# Patient Record
(demographics unavailable — no encounter records)

---

## 2025-02-07 NOTE — REASON FOR VISIT
[FreeTextEntry2] : 02/07/2025: 33-year-old female here today for new Injury. They have LT hand pain. She states in the middle of November; she has been experiencing numbness and tingling. Assumes it's a pinch nerve. On and off discomfort. Had same issue in 2017 and went to PT for it.

## 2025-02-07 NOTE — ASSESSMENT
[FreeTextEntry1] :   Carpal tunnel syndrome is a progressive problem that once occurs will be a chronic issue that will likely continue until surgical treatment is necessary. Carpal tunnel may or may not be symptomatic. Treatment options for carpal tunnel include OTC NSAIDS, prescription NSAIDS, ice, bracing, OT, cortisone injection, and surgical release.  I rec EMG  Medrol Dose Tanvir prescribed for pain, swelling and inflammation. Patient instructed to take as directed on packaging to be completed in 6 days.  return after EMG

## 2025-02-07 NOTE — HISTORY OF PRESENT ILLNESS
[5] : 5 [Dull/Aching] : dull/aching [de-identified] : L hand numbness since Nov She had similar issue in 2017 which went away with therapy

## 2025-02-07 NOTE — PHYSICAL EXAM
[de-identified] : L hand:  Tender volar wrist  Good finger ROM  +Tinels  +Phalens  +Compression test  Decreased sensation median nerve distribution

## 2025-02-24 NOTE — REASON FOR VISIT
[FreeTextEntry2] : 02/24/2025: 33-year-old female here for follow up L hand pain, carpal tunnel and numbness, she had EMG on 2/17/25 [facility] is better since last visit

## 2025-02-24 NOTE — ASSESSMENT
[FreeTextEntry1] : Although EMG is normal I still think she has CTS due to clinical signs Her CTS is minimal at this time  I recommend the patient take over the counter medication such as Advil/Motrin/Aleve or Tylenol for pain and inflammation   Carpal tunnel syndrome is a progressive problem that once occurs will be a chronic issue that will likely continue until surgical treatment is necessary. Carpal tunnel may or may not be symptomatic. Treatment options for carpal tunnel include OTC NSAIDS, prescription NSAIDS, ice, bracing, OT, cortisone injection, and surgical release.  Return prn

## 2025-02-24 NOTE — PHYSICAL EXAM
[de-identified] : L hand:  Tender volar wrist  Good finger ROM  +Tinels  +Phalens  +Compression test  Improved sensaiton

## 2025-02-24 NOTE — HISTORY OF PRESENT ILLNESS
[Dull/Aching] : dull/aching [de-identified] : She is much better from [facility]  For the first time I independently reviewed the upper extremity EMG from 2/17/25 [facility] The clinically relevant findings shows normal study [3] : 3 [Tingling] : tingling [FreeTextEntry1] : L hand  [FreeTextEntry6] : numbness